# Patient Record
Sex: FEMALE | Race: WHITE | NOT HISPANIC OR LATINO | URBAN - METROPOLITAN AREA
[De-identification: names, ages, dates, MRNs, and addresses within clinical notes are randomized per-mention and may not be internally consistent; named-entity substitution may affect disease eponyms.]

---

## 2017-04-11 ENCOUNTER — EMERGENCY (EMERGENCY)
Facility: HOSPITAL | Age: 82
LOS: 1 days | Discharge: HOME | End: 2017-04-11
Attending: EMERGENCY MEDICINE | Admitting: EMERGENCY MEDICINE
Payer: MEDICARE

## 2017-04-11 VITALS
OXYGEN SATURATION: 95 % | DIASTOLIC BLOOD PRESSURE: 97 MMHG | TEMPERATURE: 98 F | WEIGHT: 184.97 LBS | SYSTOLIC BLOOD PRESSURE: 163 MMHG | HEART RATE: 138 BPM | RESPIRATION RATE: 16 BRPM
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
## 2017-04-11 DIAGNOSIS — S52.501A UNSPECIFIED FRACTURE OF THE LOWER END OF RIGHT RADIUS, INITIAL ENCOUNTER FOR CLOSED FRACTURE: ICD-10-CM

## 2017-04-11 DIAGNOSIS — S52.601A UNSPECIFIED FRACTURE OF LOWER END OF RIGHT ULNA, INITIAL ENCOUNTER FOR CLOSED FRACTURE: ICD-10-CM

## 2017-04-11 DIAGNOSIS — S69.91XA UNSPECIFIED INJURY OF RIGHT WRIST, HAND AND FINGER(S), INITIAL ENCOUNTER: ICD-10-CM

## 2017-04-11 PROCEDURE — 99284 EMERGENCY DEPT VISIT MOD MDM: CPT

## 2017-04-11 PROCEDURE — 73110 X-RAY EXAM OF WRIST: CPT | Mod: 26,RT

## 2017-04-11 PROCEDURE — 73090 X-RAY EXAM OF FOREARM: CPT | Mod: 26,LT

## 2017-04-12 VITALS
DIASTOLIC BLOOD PRESSURE: 87 MMHG | OXYGEN SATURATION: 97 % | HEART RATE: 76 BPM | SYSTOLIC BLOOD PRESSURE: 143 MMHG | RESPIRATION RATE: 16 BRPM

## 2017-04-12 DIAGNOSIS — S52.501A UNSPECIFIED FRACTURE OF THE LOWER END OF RIGHT RADIUS, INITIAL ENCOUNTER FOR CLOSED FRACTURE: ICD-10-CM

## 2017-04-12 PROCEDURE — 73090 X-RAY EXAM OF FOREARM: CPT

## 2017-04-12 PROCEDURE — 73110 X-RAY EXAM OF WRIST: CPT

## 2017-04-12 PROCEDURE — 99285 EMERGENCY DEPT VISIT HI MDM: CPT | Mod: 25

## 2017-04-12 PROCEDURE — 25605 CLTX DST RDL FX/EPHYS SEP W/: CPT | Mod: RT

## 2017-04-12 PROCEDURE — 73110 X-RAY EXAM OF WRIST: CPT | Mod: 26,RT

## 2017-04-12 NOTE — ED PROVIDER NOTE - OBJECTIVE STATEMENT
Pt is a 82 yo female fell onto outstretched right wrist. pt with pain and swelling of wrist, no other injury.  no numbness or weakness

## 2017-04-12 NOTE — CONSULT NOTE ADULT - SUBJECTIVE AND OBJECTIVE BOX
81 year old female status post mechanical fall today complaining of right wrist pain and deformity. She denies head strike, LOC, numbness, tingling, weakness, or pain elsewhere in her body at this time. Patient is RHD and is an independent ambulator, no other complaints    PMHx: Denies  PSHx: B/L Total Knee Arthroplasties ([facility])  Allergies: NKDA    PE  -VS: AVSS  -Gen: AAOx3, NAD  -RUE: skin intact, no abrasions or ecchymosis. + wrist pain, swelling, and deformity. no TTP finger, elbow, forearm, or shoulder. AIN/PIN/R/U/M +, SILT C5-T1, radial pulse 2+, comparments soft  -Secondary Survey: unremarkable    Imaging  -XR Right Wrist: Displaced, comminuted, intra-articular distal radius fracture and ulnar styloid fracture    Procedure  -Right wrist cleaned and prepped with alcohol swabs. 10cc 1% lidocaine without epineprhine injected into dorsal wrist for hematoma block. Closed reduction with sugartong application to left wrist performed. Patient tolerated well, NVI post-procedure    Post-reduction XR  -Acceptable reduction

## 2017-04-12 NOTE — ED PROVIDER NOTE - MUSCULOSKELETAL, MLM
Spine appears normal.  right wrist with swelling ttp, mild deformity, sm intact, no other bony ttp. , 2+ pulses

## 2017-04-12 NOTE — CONSULT NOTE ADULT - PROBLEM SELECTOR RECOMMENDATION 9
1. Pain control  2. NWB RUE in splint and sling  3. Keep splint c/d/i  4. Rest, ice, elevate right upper extremity  5. Follow up with Dr. Loera this week  6. Explained to patient that she may require surgery for her injury. Patient verbalizes understanding and will follow up closely as an outpatient  7. Return to ED if patient has any severe or worsening pain, swelling, numbness, tingling, weakness, etc.

## 2017-04-19 ENCOUNTER — APPOINTMENT (OUTPATIENT)
Dept: INTERNAL MEDICINE | Facility: CLINIC | Age: 82
End: 2017-04-19

## 2017-04-19 VITALS
OXYGEN SATURATION: 98 % | HEIGHT: 70 IN | HEART RATE: 80 BPM | SYSTOLIC BLOOD PRESSURE: 150 MMHG | BODY MASS INDEX: 25.77 KG/M2 | RESPIRATION RATE: 14 BRPM | DIASTOLIC BLOOD PRESSURE: 80 MMHG | WEIGHT: 180 LBS

## 2017-04-19 VITALS — DIASTOLIC BLOOD PRESSURE: 80 MMHG | SYSTOLIC BLOOD PRESSURE: 140 MMHG

## 2017-04-19 DIAGNOSIS — Z01.818 ENCOUNTER FOR OTHER PREPROCEDURAL EXAMINATION: ICD-10-CM

## 2017-04-19 DIAGNOSIS — S62.109A FRACTURE OF UNSPECIFIED CARPAL BONE, UNSPECIFIED WRIST, INITIAL ENCOUNTER FOR CLOSED FRACTURE: ICD-10-CM

## 2017-04-19 RX ORDER — ASPIRIN 81 MG
81 TABLET, DELAYED RELEASE (ENTERIC COATED) ORAL DAILY
Qty: 60 | Refills: 3 | Status: ACTIVE | COMMUNITY

## 2017-04-20 LAB
ANION GAP SERPL CALC-SCNC: 17 MMOL/L
BUN SERPL-MCNC: 18 MG/DL
CALCIUM SERPL-MCNC: 9.8 MG/DL
CHLORIDE SERPL-SCNC: 104 MMOL/L
CO2 SERPL-SCNC: 22 MMOL/L
CREAT SERPL-MCNC: 0.86 MG/DL
GLUCOSE SERPL-MCNC: 126 MG/DL
POTASSIUM SERPL-SCNC: 4 MMOL/L
SODIUM SERPL-SCNC: 143 MMOL/L